# Patient Record
Sex: MALE | Race: WHITE | NOT HISPANIC OR LATINO | Employment: FULL TIME | URBAN - METROPOLITAN AREA
[De-identification: names, ages, dates, MRNs, and addresses within clinical notes are randomized per-mention and may not be internally consistent; named-entity substitution may affect disease eponyms.]

---

## 2020-10-20 ENCOUNTER — HISTORICAL (OUTPATIENT)
Dept: ADMINISTRATIVE | Facility: HOSPITAL | Age: 50
End: 2020-10-20

## 2021-08-09 ENCOUNTER — HISTORICAL (OUTPATIENT)
Dept: ADMINISTRATIVE | Facility: HOSPITAL | Age: 51
End: 2021-08-09

## 2021-08-09 LAB — SARS-COV-2 RNA RESP QL NAA+PROBE: NEGATIVE

## 2022-04-10 ENCOUNTER — HISTORICAL (OUTPATIENT)
Dept: ADMINISTRATIVE | Facility: HOSPITAL | Age: 52
End: 2022-04-10

## 2022-04-26 VITALS
SYSTOLIC BLOOD PRESSURE: 110 MMHG | HEIGHT: 70 IN | DIASTOLIC BLOOD PRESSURE: 71 MMHG | BODY MASS INDEX: 24.2 KG/M2 | WEIGHT: 169.06 LBS | OXYGEN SATURATION: 97 %

## 2022-05-02 NOTE — HISTORICAL [facility]ERNER
This is a historical note converted from [employer]. Formatting and pictures may have been removed.  Please reference [employer] for original formatting and attached multimedia. Chief Complaint  lower back pain x 2 days, no injury or trauma  History of Present Illness  50-year-old white male presents to clinic complaining of low back pain for the past 2 days. ?Patient states?he was lifting up on 200 pound pieces of concrete?when this all started. ?Otherwise denies any fall injury or trauma.? States the pain does radiate into the bilateral legs but only to the?very upper thigh area. ?Denies any weakness numbness or tingling of the lower extremities.? Denies any bladder or bowel dysfunction. ?Denies any urinary burning or weak stream.? No history of back issues. ?Tried Flexeril did not help.  Review of Systems  Constitutional: negative except as stated in HPI  Eye: negative except as stated in HPI  ENT: negative except as stated in HPI  Respiratory: negative except as stated in HPI  Cardiovascular: negative except as stated in HPI  Gastrointestinal: negative except as stated in HPI  Genitourinary: negative except as stated in HPI  Hema/Lymph: negative except as stated in HPI  Endocrine: negative except as stated in HPI  Immunologic: negative except as stated in HPI  Musculoskeletal: negative except as stated in HPI  Integumentary: negative except as stated in HPI  Neurologic: negative except as stated in HPI  All Other ROS_ negative except as stated in HPI  Physical Exam  Vitals & Measurements  T:?36.5? ?C (Oral)? HR:?76(Peripheral)? RR:?18? BP:?115/75? SpO2:?99%?  HT:?178.00?cm? WT:?74.000?kg? BMI:?23.36?  General_well-developed well-nourished in no acute distress  Musculoskeletal_tenderness?over the paravertebral musculature of the lumbar level with hypertonicity.? 5 out of 5 strength in the lower extremities bilaterally.  Integumentary_no rashes or skin lesions present  Neurologic_ cranial nerves intact, no signs of  peripheral neurological deficit, motor/sensory function intact  ?  Assessment/Plan  1.?Low back pain?M54.5  ?Start the?anti-inflammatories this evening since?you received an anti-inflammatory injection today.? Muscle relaxer and pain medication may make you drowsy. ?Do not take them at the same time.? Spread them out by 4 to 6 hours at least.? Do not drink alcohol, drive or operate heavy machinery when taking them.? Apply heat or ice to the affected area 3-4 times a day for 10 to 15 minutes.? No heavy lifting for 1 week.? If your symptoms persist or worsen seek medical attention immediately  Ordered:  acetaminophen-HYDROcodone, 1 tab(s), Oral, q6hr, PRN PRN as needed for pain, X 3 day(s), # 12 tab(s), 0 Refill(s), Pharmacy: [facility], 178, cm, Height/Length Dosing, 10/20/20 10:26:00 CDT, 74, kg, Weight Dosing, 10/20/20 10:26:00 CDT  diclofenac, 75 mg = 1 tab(s), Oral, BID, # 14 tab(s), 0 Refill(s), Pharmacy: [facility], 178, cm, Height/Length Dosing, 10/20/20 10:26:00 CDT, 74, kg, Weight Dosing, 10/20/20 10:26:00 CDT  metaxalone, 800 mg = 1 tab(s), Oral, TID, PRN PRN as needed for pain, Use for muscle spasm/pain, X 10 day(s), # 30 tab(s), 0 Refill(s), Pharmacy: [facility], 178, cm, Height/Length Dosing, 10/20/20 10:26:00 CDT, 74, kg, Weight Dosing, 10/20/20...  Office/Outpatient Visit Level 3 Established [id number] PC, Low back pain, [facility], 10/20/20 10:40:00 CDT  XR Spine Lumbar 2 or 3 Views, Stat, 10/20/20 10:40:00 CDT, 2-day history of low back pain. No fall injury or trauma, None, Ambulatory, Rad Type, Low back pain, Not Scheduled, 10/20/20 10:40:00 CDT  ?   Problem List/Past Medical History  Ongoing  Hyperlipidemia  Sinus tachycardia  Tobacco user  Historical  Abscess or cellulitis, hand  Procedure/Surgical History  Appendectomy  Cholecystectomy  FESS - FSS sphenoidotomy  Hernia repair  knee surgery  rght hand sutgery  RHINOPLASTY  shoulder surgery  T&A    Medications  acetaminophen-hydrocodone 325 mg-5 mg oral tablet, 1 tab(s), Oral, q6hr, PRN  alPRAzolam 1 mg oral tablet, 1 mg= 1 tab(s), Oral, TID, PRN  diclofenac sodium 75 mg oral delayed release tablet, 75 mg= 1 tab(s), Oral, BID  lovastatin 40 mg oral tablet, 40 mg= 1 tab(s), Oral, BID  Promethazine DM 6.25 mg-15 mg/5 mL oral syrup, 5 mL, Oral, q6hr, PRN,? ?Not taking: Last Dose Date/Time Unknown  Skelaxin 800 mg oral tablet, 800 mg= 1 tab(s), Oral, TID, PRN  Toprol-XL 25 mg oral tablet, extended release  Allergies  Demerol HCl  iodine  Social History  Abuse/Neglect  No, 10/20/2020  Alcohol  Current, Beer, 1-2 times per year, 05/07/2018  Substance Use  Never, 05/07/2018  Tobacco  4 or less cigarettes(less than 1/4 pack)/day in last 30 days, Cigarettes, No, 10/20/2020  Family History  Heart attack: Father.  Immunizations  Vaccine Date Status   tetanus/diphtheria/pertussis, acel(Tdap) 07/11/2016 Given   Health Maintenance  Health Maintenance  ???Pending?(in the next year)  ??? ??OverDue  ??? ? ? ?Influenza Vaccine due??10/01/19??and every 1??day(s)  ??? ? ? ?Smoking Cessation due??01/01/20??and every 1??year(s)  ??? ? ? ?Alcohol Misuse Screening due??01/02/20??and every 1??year(s)  ??? ??Due?  ??? ? ? ?ADL Screening due??10/20/20??and every 1??year(s)  ??? ? ? ?Aspirin Therapy for CVD Prevention due??10/20/20??and every 1??year(s)  ??? ? ? ?Colorectal Screening due??10/20/20??and every?  ??? ? ? ?Depression Screening due??10/20/20??and every?  ??? ? ? ?Zoster Vaccine due??10/20/20??and every?  ??? ??Due In Future?  ??? ? ? ?Obesity Screening not due until??01/01/21??and every 1??year(s)  ???Satisfied?(in the past 1 year)  ??? ??Satisfied?  ??? ? ? ?Blood Pressure Screening on??10/20/20.??Satisfied by Jackson Deras  ??? ? ? ?Body Mass Index Check on??10/20/20.??Satisfied by Jackson Deras  ??? ? ? ?Obesity Screening on??10/20/20.??Satisfied by Jackson Deras  ?  Diagnostic Results  Mild degenerative  changes on x-ray

## 2022-07-07 ENCOUNTER — OFFICE VISIT (OUTPATIENT)
Dept: URGENT CARE | Facility: CLINIC | Age: 52
End: 2022-07-07
Payer: COMMERCIAL

## 2022-07-07 VITALS
WEIGHT: 153 LBS | DIASTOLIC BLOOD PRESSURE: 77 MMHG | OXYGEN SATURATION: 99 % | BODY MASS INDEX: 22.66 KG/M2 | TEMPERATURE: 98 F | HEART RATE: 75 BPM | HEIGHT: 69 IN | RESPIRATION RATE: 17 BRPM | SYSTOLIC BLOOD PRESSURE: 121 MMHG

## 2022-07-07 DIAGNOSIS — R09.81 NASAL CONGESTION: Primary | ICD-10-CM

## 2022-07-07 DIAGNOSIS — J32.9 SINUSITIS, UNSPECIFIED CHRONICITY, UNSPECIFIED LOCATION: ICD-10-CM

## 2022-07-07 LAB
CTP QC/QA: YES
SARS-COV-2 RDRP RESP QL NAA+PROBE: NEGATIVE

## 2022-07-07 PROCEDURE — 99213 PR OFFICE/OUTPT VISIT, EST, LEVL III, 20-29 MIN: ICD-10-PCS | Mod: 25,,, | Performed by: FAMILY MEDICINE

## 2022-07-07 PROCEDURE — 1159F PR MEDICATION LIST DOCUMENTED IN MEDICAL RECORD: ICD-10-PCS | Mod: CPTII,,, | Performed by: FAMILY MEDICINE

## 2022-07-07 PROCEDURE — U0002 COVID-19 LAB TEST NON-CDC: HCPCS | Mod: QW,,, | Performed by: FAMILY MEDICINE

## 2022-07-07 PROCEDURE — 3078F PR MOST RECENT DIASTOLIC BLOOD PRESSURE < 80 MM HG: ICD-10-PCS | Mod: CPTII,,, | Performed by: FAMILY MEDICINE

## 2022-07-07 PROCEDURE — 3008F BODY MASS INDEX DOCD: CPT | Mod: CPTII,,, | Performed by: FAMILY MEDICINE

## 2022-07-07 PROCEDURE — 1159F MED LIST DOCD IN RCRD: CPT | Mod: CPTII,,, | Performed by: FAMILY MEDICINE

## 2022-07-07 PROCEDURE — 1160F RVW MEDS BY RX/DR IN RCRD: CPT | Mod: CPTII,,, | Performed by: FAMILY MEDICINE

## 2022-07-07 PROCEDURE — 96372 THER/PROPH/DIAG INJ SC/IM: CPT | Mod: ,,, | Performed by: FAMILY MEDICINE

## 2022-07-07 PROCEDURE — 1160F PR REVIEW ALL MEDS BY PRESCRIBER/CLIN PHARMACIST DOCUMENTED: ICD-10-PCS | Mod: CPTII,,, | Performed by: FAMILY MEDICINE

## 2022-07-07 PROCEDURE — 3078F DIAST BP <80 MM HG: CPT | Mod: CPTII,,, | Performed by: FAMILY MEDICINE

## 2022-07-07 PROCEDURE — 3074F PR MOST RECENT SYSTOLIC BLOOD PRESSURE < 130 MM HG: ICD-10-PCS | Mod: CPTII,,, | Performed by: FAMILY MEDICINE

## 2022-07-07 PROCEDURE — U0002: ICD-10-PCS | Mod: QW,,, | Performed by: FAMILY MEDICINE

## 2022-07-07 PROCEDURE — 99213 OFFICE O/P EST LOW 20 MIN: CPT | Mod: 25,,, | Performed by: FAMILY MEDICINE

## 2022-07-07 PROCEDURE — 3008F PR BODY MASS INDEX (BMI) DOCUMENTED: ICD-10-PCS | Mod: CPTII,,, | Performed by: FAMILY MEDICINE

## 2022-07-07 PROCEDURE — 3074F SYST BP LT 130 MM HG: CPT | Mod: CPTII,,, | Performed by: FAMILY MEDICINE

## 2022-07-07 PROCEDURE — 96372 PR INJECTION,THERAP/PROPH/DIAG2ST, IM OR SUBCUT: ICD-10-PCS | Mod: ,,, | Performed by: FAMILY MEDICINE

## 2022-07-07 RX ORDER — DEXAMETHASONE SODIUM PHOSPHATE 100 MG/10ML
10 INJECTION INTRAMUSCULAR; INTRAVENOUS
Status: COMPLETED | OUTPATIENT
Start: 2022-07-07 | End: 2022-07-07

## 2022-07-07 RX ORDER — DEXBROMPHENIRAMINE MALEATE 2 MG/1
1 TABLET ORAL 2 TIMES DAILY PRN
Qty: 30 TABLET | Refills: 1 | Status: SHIPPED | OUTPATIENT
Start: 2022-07-07 | End: 2022-07-22

## 2022-07-07 RX ORDER — LOVASTATIN 40 MG/1
TABLET ORAL
COMMUNITY
Start: 2022-05-26

## 2022-07-07 RX ORDER — METOPROLOL SUCCINATE 25 MG/1
25 TABLET, EXTENDED RELEASE ORAL DAILY
COMMUNITY
Start: 2022-05-26

## 2022-07-07 RX ORDER — ALPRAZOLAM 2 MG/1
2 TABLET ORAL DAILY PRN
COMMUNITY
Start: 2022-05-20

## 2022-07-07 RX ORDER — AMOXICILLIN AND CLAVULANATE POTASSIUM 875; 125 MG/1; MG/1
1 TABLET, FILM COATED ORAL EVERY 12 HOURS
Qty: 14 TABLET | Refills: 0 | Status: SHIPPED | OUTPATIENT
Start: 2022-07-07 | End: 2022-07-14

## 2022-07-07 RX ORDER — PREDNISONE 10 MG/1
30 TABLET ORAL DAILY
Qty: 15 TABLET | Refills: 0 | Status: SHIPPED | OUTPATIENT
Start: 2022-07-07 | End: 2022-07-12

## 2022-07-07 RX ADMIN — DEXAMETHASONE SODIUM PHOSPHATE 10 MG: 100 INJECTION INTRAMUSCULAR; INTRAVENOUS at 11:07

## 2022-07-07 NOTE — PATIENT INSTRUCTIONS
COVID negative  Medication sent to pharmacy  Star steroids tomorrow morning  Hold antibiotics for 3-4 days and start symptoms persist  Antihistamine may cause drowsiness  If symptoms persist or worsen return to clinic or seek medical attention immediately

## 2022-07-07 NOTE — PROGRESS NOTES
"Subjective:       Patient ID: Alfa Siegel is a 52 y.o. male.    Vitals:  height is 5' 9" (1.753 m) and weight is 69.4 kg (153 lb). His temperature is 97.5 °F (36.4 °C). His blood pressure is 121/77 and his pulse is 75. His respiration is 17 and oxygen saturation is 99%.     Chief Complaint: Headache, Nasal Congestion, and Cough    52-year-old male presents to clinic complaining of a mild cough and congestion with postnasal drip that began 2 days ago.  States he was exposed to COVID 6 days ago.  Denies any fever shortness of breath.  States he does have a little diarrhea.    Headache   This is a new problem. The current episode started in the past 7 days. The problem has been unchanged. Associated symptoms include coughing and a sore throat. The treatment provided no relief.   Cough  This is a new problem. The current episode started in the past 7 days. The cough is non-productive. Associated symptoms include headaches and a sore throat. The treatment provided no relief.       Constitution: Negative.   HENT: Positive for sore throat.    Neck: neck negative.   Cardiovascular: Negative.    Eyes: Negative.    Respiratory: Positive for cough.    Gastrointestinal: Negative.    Genitourinary: Negative.    Musculoskeletal: Negative.    Skin: Negative.    Allergic/Immunologic: Negative.    Neurological: Positive for headaches.   Hematologic/Lymphatic: Negative.        Objective:      Physical Exam   Constitutional: He is oriented to person, place, and time. He does not appear ill.   HENT:   Head: Normocephalic and atraumatic.   Ears:   Right Ear: External ear normal.   Left Ear: External ear normal.   Eyes: Conjunctivae are normal.   Pulmonary/Chest: Effort normal. No stridor. No respiratory distress. He has no wheezes. He has no rhonchi. He has no rales.   Abdominal: Normal appearance.   Neurological: He is alert and oriented to person, place, and time.   Psychiatric: His behavior is normal. Mood, judgment and thought " "content normal.   Vitals reviewed.         Previous History      Review of patient's allergies indicates:   Allergen Reactions    Carbamazepine     Iodine and iodide containing products     Meperidine     Shellfish derived        Past Medical History:   Diagnosis Date    High cholesterol     Supraventricular tachycardia      Current Outpatient Medications   Medication Instructions    ALPRAZolam (XANAX) 2 mg, Oral, Daily PRN    amoxicillin-clavulanate 875-125mg (AUGMENTIN) 875-125 mg per tablet 1 tablet, Oral, Every 12 hours    dexbrompheniramine maleate (ALA-HIST IR) 2 mg Tab 1 tablet, Oral, 2 times daily PRN    lovastatin (MEVACOR) 40 MG tablet Oral    metoprolol succinate (TOPROL-XL) 25 mg, Oral, Daily    predniSONE (DELTASONE) 30 mg, Oral, Daily     History reviewed. No pertinent surgical history.  Family History   Problem Relation Age of Onset    Prostate cancer Father        Social History     Tobacco Use    Smoking status: Never Smoker    Smokeless tobacco: Never Used   Substance Use Topics    Alcohol use: Yes    Drug use: Never        Physical Exam      Vital Signs Reviewed   /77   Pulse 75   Temp 97.5 °F (36.4 °C)   Resp 17   Ht 5' 9" (1.753 m)   Wt 69.4 kg (153 lb)   SpO2 99%   BMI 22.59 kg/m²        Procedures    Procedures     Labs     Results for orders placed or performed in visit on 07/07/22   POCT COVID-19 Rapid Screening   Result Value Ref Range    POC Rapid COVID Negative Negative     Acceptable Yes          Assessment:       1. Nasal congestion    2. Sinusitis, unspecified chronicity, unspecified location          Plan:       COVID negative  Medication sent to pharmacy  Star steroids tomorrow morning  Hold antibiotics for 3-4 days and start symptoms persist  Antihistamine may cause drowsiness  If symptoms persist or worsen return to clinic or seek medical attention immediately    Nasal congestion  -     POCT COVID-19 Rapid Screening    Sinusitis, " unspecified chronicity, unspecified location    Other orders  -     dexamethasone injection 10 mg  -     predniSONE (DELTASONE) 10 MG tablet; Take 3 tablets (30 mg total) by mouth once daily. for 5 days  Dispense: 15 tablet; Refill: 0  -     dexbrompheniramine maleate (ALA-HIST IR) 2 mg Tab; Take 1 tablet by mouth 2 (two) times daily as needed (allergies).  Dispense: 30 tablet; Refill: 1  -     amoxicillin-clavulanate 875-125mg (AUGMENTIN) 875-125 mg per tablet; Take 1 tablet by mouth every 12 (twelve) hours. for 7 days  Dispense: 14 tablet; Refill: 0